# Patient Record
(demographics unavailable — no encounter records)

---

## 2025-07-16 NOTE — PLAN
[TextEntry] : 40-year-old male status post subtotal colectomy with end ileostomy for colitis.  Ileostomy is functioning well.  I recommend Imodium as needed to help manage the stoma output.  Plan to renew prescription form for stoma supplies once it is faxed.  With regards to ileostomy reversal, he needs to be more conditioned and able to ambulate in order to consider elective surgery to reverse ileostomy.  Also, in light of his cardiac issues, he would need that to be completely evaluated and rectified prior to proceeding with surgery.

## 2025-07-16 NOTE — PHYSICAL EXAM
[Respiratory Effort] : normal respiratory effort [Calm] : calm [de-identified] : Soft, nontender, nondistended.  ileostomy is healthy and functioning.  Midline incision has healed completely [de-identified] : In no distress [de-identified] : Normocephalic, atraumatic [de-identified] : On a stretcher [de-identified] : Warm and dry [de-identified] : Alert and oriented x 3

## 2025-07-16 NOTE — HISTORY OF PRESENT ILLNESS
[FreeTextEntry1] : 40-year-old male who presents for a follow up visit after undergoing an emergent subtotal colectomy with end ileostomy for severe sepsis and colitis in February 2025.  He had a prolonged hospital course and ultimately rehab.  He is feeling stronger but is still in a stretcher and not able to walk.  He says ileostomy is functioning well without any issues with high output although he does have days where the output is quite loose.  Has not been using Imodium consistently given the size of the tablet.  He is here mostly because he needed he is ileostomy supplies renewed but did not bring the paperwork required to be filled out.  His midline incision is healed completely without any further drainage.  He inquires about ileostomy reversal however his aunt reports a recent hospitalization at [facility] with heart failure for which he needed a defibrillator placed.  He is also being evaluated for possible heart transplant.